# Patient Record
Sex: FEMALE | Race: WHITE | NOT HISPANIC OR LATINO | URBAN - METROPOLITAN AREA
[De-identification: names, ages, dates, MRNs, and addresses within clinical notes are randomized per-mention and may not be internally consistent; named-entity substitution may affect disease eponyms.]

---
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
## 2022-02-12 VITALS
OXYGEN SATURATION: 99 % | SYSTOLIC BLOOD PRESSURE: 130 MMHG | WEIGHT: 132.1 LBS | TEMPERATURE: 98 F | HEART RATE: 82 BPM | HEIGHT: 66 IN | BODY MASS INDEX: 21.23 KG/M2 | DIASTOLIC BLOOD PRESSURE: 72 MMHG

## 2022-02-12 VITALS
BODY MASS INDEX: 21.07 KG/M2 | HEIGHT: 64 IN | SYSTOLIC BLOOD PRESSURE: 110 MMHG | WEIGHT: 123.4 LBS | DIASTOLIC BLOOD PRESSURE: 65 MMHG

## 2022-02-16 NOTE — LETTER
November 11, 2020    GABRIELA STOVALL  [address], [place] [id number]    Dear GABRIELA,    Thank you for selecting our practice as your care provider. Below you will find the results and recent diagnostic testings outcomes we have reviewed.      COVID 19 Negative       Result Name Current Result Reference Range   Coronavirus SARS-CoV-2 (COVID-19)  Not Detected 11/5/2020 Not Detected -        Please contact my practice at the number listed above if you have any questions or concerns.     Sincerely,    Naomie Arreola MA

## 2022-02-16 NOTE — NURSING NOTE
Comprehensive Intake Entered On:  11/4/2020 1:17 PM CST    Performed On:  11/4/2020 1:15 PM CST by Shiloh Riggs CMA               Summary   Chief Complaint :   c/o sore throat, headache, ear pain, runny nose. Sx present x2 days. Verbal consent given for video visit.      Menstrual Status :   Menarcheal   Oneil Shiloh HANKINS - 11/4/2020 1:15 PM CST   Consents   Consent for Immunization Exchange :   Consent Granted   Consent for Immunizations to Providers :   Consent Granted   PastorMarianne Shiloh HANKINS - 11/4/2020 1:15 PM CST   Meds / Allergies   (As Of: 11/4/2020 1:17:37 PM CST)   Allergies (Active)   amoxicillin  Estimated Onset Date:   Unspecified ; Reactions:   Rash (bumps) ; Created By:   Viji Castillo; Reaction Status:   Active ; Category:   Drug ; Substance:   amoxicillin ; Type:   Allergy ; Updated By:   Viji Castillo; Reviewed Date:   9/1/2016 5:47 AM CDT      Singulair  Estimated Onset Date:   Unspecified ; Reactions:   Mood ; Created By:   Viji Castillo; Reaction Status:   Active ; Substance:   Singulair ; Updated By:   Viji Castillo; Reviewed Date:   9/1/2016 5:47 AM CDT        Medication List   (As Of: 11/4/2020 1:17:37 PM CST)   Prescription/Discharge Order    albuterol  :   albuterol ; Status:   Prescribed ; Ordered As Mnemonic:   albuterol CFC free 90 mcg/inh inhalation aerosol ; Simple Display Line:   2 puff(s), INH, QID, PRN: for wheezing, 17 g, 0 Refill(s) ; Ordering Provider:   Sariah Travis; Catalog Code:   albuterol ; Order Dt/Tm:   8/31/2016 1:35:08 PM CDT            ID Risk Screen   Recent Travel History :   No recent travel   Family Member Travel History :   No recent travel   Other Exposure to Infectious Disease :   Unknown   Shiloh Riggs CMA - 11/4/2020 1:15 PM CST

## 2022-02-16 NOTE — TELEPHONE ENCOUNTER
---------------------  From: Hector HANKINS Elisabeth   Sent: 11/4/2020 2:16:53 PM CST  Subject: [facility] Testing     Pt here for covid testing at [facility] today per Dr. Robles. 02 Sat =97%. Specimen sent to [facility] lab. Faxed forms to [facility].

## 2022-02-16 NOTE — TELEPHONE ENCOUNTER
---------------------  From: Mireya Nix   Sent: 11/8/2020 11:11:03 AM CST  Subject: Result: COVID-19     Left message for patient at 11:10 regarding COVID-19 test.  Result is NEGATIVE.  Patient to call with questions or concerns.

## 2022-02-16 NOTE — NURSING NOTE
Comprehensive Intake Entered On:  7/19/2021 2:01 PM CDT    Performed On:  7/19/2021 1:52 PM CDT by Akua Steward LPN               Summary   Chief Complaint :   needs a px for [facility]   Menstrual Status :   Menarcheal   Weight Measured :   132.1 lb(Converted to: 132 lb 2 oz, 59.920 kg)    Height Measured :   66.25 in(Converted to: 5 ft 6 in, 168.27 cm)    Body Mass Index :   21.16 kg/m2   Body Surface Area :   1.67 m2   Systolic Blood Pressure :   130 mmHg   Diastolic Blood Pressure :   72 mmHg   Mean Arterial Pressure :   91 mmHg   Peripheral Pulse Rate :   82 bpm   BP Site :   Right arm   BP Method :   Manual   Temperature Tympanic :   98.0 DegF(Converted to: 36.7 DegC)    Oxygen Saturation :   99 %   Akua Steward LPN - 7/19/2021 1:52 PM CDT   Health Status   Allergies Verified? :   Yes   Medication History Verified? :   Yes   Medical History Verified? :   No   Pre-Visit Planning Status :   Completed   Tobacco Use? :   Never smoker   Akua Steward LPN - 7/19/2021 1:52 PM CDT   Meds / Allergies   (As Of: 7/19/2021 2:01:23 PM CDT)   Allergies (Active)   amoxicillin  Estimated Onset Date:   Unspecified ; Reactions:   Rash (bumps) ; Created By:   Viji Castillo; Reaction Status:   Active ; Category:   Drug ; Substance:   amoxicillin ; Type:   Allergy ; Updated By:   Viji Castillo; Reviewed Date:   11/4/2020 1:19 PM CST      Singulair  Estimated Onset Date:   Unspecified ; Reactions:   Mood ; Created By:   Viji Castillo; Reaction Status:   Active ; Substance:   Singulair ; Updated By:   Viji Castillo; Reviewed Date:   11/4/2020 1:19 PM CST        Medication List   (As Of: 7/19/2021 2:01:23 PM CDT)   Prescription/Discharge Order    albuterol  :   albuterol ; Status:   Prescribed ; Ordered As Mnemonic:   albuterol CFC free 90 mcg/inh inhalation aerosol ; Simple Display Line:   2 puff(s), INH, QID, PRN: for wheezing, 17 g, 0 Refill(s) ; Ordering Provider:   Sariah Travis;  Catalog Code:   albuterol ; Order Dt/Tm:   8/31/2016 1:35:08 PM CDT            Vision Testing POC   Corrective Lenses :   None   Eye, Left Visual Acuity :   20/13   Eye, Right Visual Acuity :   20/15   Eye, Bilateral Visual Acuity :   20/13   Akua Steward LPN - 7/19/2021 3:50 PM CDT   Social History   Social History   (As Of: 7/19/2021 2:01:23 PM CDT)   Tobacco:        Never (less than 100 in lifetime)   (Last Updated: 7/19/2021 1:54:27 PM CDT by Akua Steward LPN)          Electronic Cigarette/Vaping:        Electronic Cigarette Use: Never.   (Last Updated: 7/19/2021 1:54:30 PM CDT by Akua Steward LPN)

## 2022-02-16 NOTE — TELEPHONE ENCOUNTER
---------------------  From: Elba/Naomie LEONARDO (Phone Messages Pool ([id number]_[facility]))   Sent: 11/11/2020 10:07:03 AM CST  Subject: General Message     Phone Message    PCP: LORENE    Time of Call: 0917    Phone Number: [phone]    Returned call at: 1006    Note: Patient called requesting that her covid results be faxed to  high school.    Called and got fax number [phone]. Faxed at [phone]+ and pt notified.

## 2022-02-16 NOTE — NURSING NOTE
Depression Screening Entered On:  7/19/2021 3:52 PM CDT    Performed On:  7/19/2021 3:51 PM CDT by Akua Steward LPN               Depression Screening   Little Interest - Pleasure in Activities :   Not at all   Feeling Down, Depressed, Hopeless :   Not at all   Initial Depression Screen Score :   0 Score   Poor Appetite or Overeating :   Not at all   Trouble Falling or Staying Asleep :   Several days   Feeling Tired or Little Energy :   Several days   Feeling Bad About Yourself :   Not at all   Trouble Concentrating :   Not at all   Moving or Speaking Slowly :   Not at all   Thoughts Better Off Dead or Hurting Self :   Not at all   Difficulty at Work, Home, Getting Along :   Not difficult at all   Detailed Depression Screen Score :   2    Total Depression Screen Score :   2    Akua Steward LPN - 7/19/2021 3:51 PM CDT

## 2022-02-16 NOTE — PROGRESS NOTES
Patient:   GABRIELA STOVALL            MRN: [id number]            FIN: [id number]               Age:   20 years     Sex:  Female     :  2000   Associated Diagnoses:   Physical exam   Author:   Abraham Robles MD      Visit Information      Date of Service: 2021 01:49 pm  Performing Location: [facility]  Encounter#: [id number]      Primary Care Provider (PCP):  Abraham Robles MD    NPI# [id number]      Referring Provider:  Abraham Robles MD    NPI# [id number]      Chief Complaint   2021 1:52 PM CDT    needs a px for [facility]        History of Present Illness             The patient presents for a general exam.  The patient's general health status is described as good.  The patient's diet is described as balanced.  Exercise: routine.  Associated symptoms consist of none.  Medical encounters: none.  Compliance problems: none.  Complaint: will be going to [employer]  plays violin  enjoys many crafts.  Additional pertinent history: no caffeine use, tobacco use none and no alcohol use.     not sexually active      Review of Systems   Constitutional:  No fever, No chills.    Eye   Ear/Nose/Mouth/Throat:  No nasal congestion, No sore throat.    Respiratory:  No shortness of breath, No cough.    Cardiovascular   Breast   Gastrointestinal:  No nausea, No vomiting, No diarrhea, No constipation.    Genitourinary:  No dysuria.    Gynecologic   Hematology/Lymphatics:  No bruising tendency, No swollen lymph glands.    Endocrine   Immunologic:  No recurrent fevers, No recurrent infections.    Musculoskeletal:  No muscle pain.    Integumentary:  No rash.    Neurologic:  No tingling, No headache.    Psychiatric   All other systems.     Health Status   Allergies:    Allergic Reactions (Selected)  Severity Not Documented  Amoxicillin (Rash (bumps))  Singulair (Mood)   Medications:  (Selected)   Prescriptions  Prescribed  albuterol CFC free 90 mcg/inh inhalation aerosol: 2  puff(s), INH, QID, PRN: for wheezing, # 17 g, 0 Refill(s), Type: Maintenance, Pharmacy: [facility], 2 puff(s) inh qid,PRN:for wheezing,    Medications          *denotes recorded medication          albuterol CFC free 90 mcg/inh inhalation aerosol: 2 puff(s), INH, QID, PRN: for wheezing, 17 g, 0 Refill(s).       Problem list:    All Problems (Selected)  Ureteral Reflux / 593.70 / Confirmed      Histories   Past Medical History:    Resolved  Pyelonephritis: Onset in the month of 9/2006 at 5 years  Resolved.  Comments:  8/26/2010 CDT 11:24 AM CDT - Viji Castillo  Hospitalized.   Family History:    No family history items have been selected or recorded.   Procedure history:    Bilateral ureteral implantation (Viktor) on 6/9/2008 at 7 Years.   Social History:        Electronic Cigarette/Vaping Assessment            Electronic Cigarette Use: Never.      Tobacco Assessment            Never (less than 100 in lifetime)        Physical Examination   Vital Signs   7/19/2021 1:52 PM CDT Temperature Tympanic 98.0 DegF    Peripheral Pulse Rate 82 bpm    Systolic Blood Pressure 130 mmHg    Diastolic Blood Pressure 72 mmHg    Mean Arterial Pressure 91 mmHg    BP Site Right arm    BP Method Manual    Oxygen Saturation 99 %      Measurements from flowsheet : Measurements   7/19/2021 1:52 PM CDT Height Measured - Standard 66.25 in    Height/Length Percentile 0.00    Height/Length Z-score -14.55    Weight Measured - Standard 132.1 lb    Weight Percentile 99.67    Weight Z-score 2.72    BSA 1.67 m2    Body Mass Index 21.16 kg/m2    Body Mass Index Percentile 45.75    BMI Z-score -0.11      General:  Alert and oriented, No acute distress.    Eye:  Pupils are equal, round and reactive to light, Normal conjunctiva.    HENT:  Normocephalic, Oral mucosa is moist.    Neck:  Supple.    Respiratory:  Lungs are clear to auscultation, Respirations are non-labored.    Cardiovascular:  Normal rate, Regular rhythm, No edema.    Gastrointestinal:   Soft, Non-tender, Non-distended.    Musculoskeletal:  Normal strength, Normal gait.    Integumentary:  Warm, No rash.    Psychiatric:  Cooperative, Appropriate mood & affect, Normal judgment.       Impression and Plan   Diagnosis     Physical exam (ISR51-ZG Z00.00).     Plan:  reviewed covid shot  discussed transition to college.

## 2022-02-16 NOTE — PROGRESS NOTES
Patient:   GABRIELA STOVALL            MRN: [id number]            FIN: [id number]               Age:   20 years     Sex:  Female     :  2000   Associated Diagnoses:   Acute URI   Author:   Abraham Robles MD      Visit Information      Date of Service: 2020 12:35 pm  Performing Location: [facility]  Encounter#: [id number]      Primary Care Provider (PCP):  Abraham Robles MD    NPI# [id number]      Referring Provider:  Abraham Robles MD    NPI# [id number]      Chief Complaint   2020 1:15 PM CST    c/o sore throat, headache, ear pain, runny nose. Sx present x2 days. Verbal consent given for video visit.        Subjective   Chief complaint 2020 1:15 PM CST    c/o sore throat, headache, ear pain, runny nose. Sx present x2 days. Verbal consent given for video visit.  .     other family with URI symptoms.   grandfather is sick and now in hospice.  no wheezing, no fever or chills    video call 115 to 125      Health Status   Allergies:    Allergic Reactions (Selected)  Severity Not Documented  Amoxicillin (Rash (bumps))  Singulair (Mood)   Medications:  (Selected)   Prescriptions  Prescribed  albuterol CFC free 90 mcg/inh inhalation aerosol: 2 puff(s), INH, QID, PRN: for wheezing, # 17 g, 0 Refill(s), Type: Maintenance, Pharmacy: [facility], 2 puff(s) inh qid,PRN:for wheezing,    Medications          *denotes recorded medication          albuterol CFC free 90 mcg/inh inhalation aerosol: 2 puff(s), INH, QID, PRN: for wheezing, 17 g, 0 Refill(s).       Problem list:    All Problems (Selected)  Ureteral Reflux / 593.70 / Confirmed      Impression and Plan   Assessment and Plan:          Diagnosis: Acute URI (NSB00-MB J06.9).         Course: uri symptoms  needs covid testing.

## 2022-02-16 NOTE — PROGRESS NOTES
Patient:   GABRIELA STOVALL            MRN: [id number]            FIN: [id number]               Age:   16 years     Sex:  Female     :  2000   Associated Diagnoses:   Well child examination   Author:   Sariah Travis      Visit Information      Date of Service: 2017 03:48 pm  Performing Location: [facility]  Encounter#: [id number]      Primary Care Provider (PCP):  Abraham Robles MD    NPI# [id number]      Chief Complaint   2017 3:54 PM CDT     Patient is here for sports physical / WCC.      Well Child History   Chief complaint reviewed and confirmed with patient.    The patient is a 16-year-old female here today for routine health maintenance visit.  Her menstrual cycle started three years ago.  Her cycles are regular.  She denies dysmenorrhea or menorrhagia.  She is not sexually active.  She does not smoke.  She is a very healthy teenager.  She is not participating in sports this year as she has decided to take ballet instead.  She does have a history of reactive airway disease that she has fairly well-controlled recently.  Patient notes her reactive airway disease flares with stress.  Her mother would like for me to discuss with the patient today tracking her menstrual cycle.     Immunizations are up to date.  HPV is up to date as well.            Review of Systems   Constitutional:  Negative.    Eye:  Negative.    Ear/Nose/Mouth/Throat:  Negative.    Respiratory:  Negative.    Cardiovascular:  Negative.    Gastrointestinal:  Negative.    Genitourinary:  Negative.    Musculoskeletal:  Negative.    Integumentary:  Negative.             Health Status   Allergies:    Allergic Reactions (Selected)  Severity Not Documented  Amoxicillin (Rash (bumps))  Singulair (Mood)   Medications:  (Selected)   Prescriptions  Prescribed  albuterol 90 mcg/inh inhalation aerosol: See Instructions, Instructions: 2 puffs q 4 hours prn cough, # 1 EA, 3 Refill(s), Type: Maintenance, Pharmacy:  [id number]  albuterol CFC free 90 mcg/inh inhalation aerosol: 2 puff(s), INH, QID, PRN: for wheezing, # 17 g, 0 Refill(s), Type: Maintenance, Pharmacy: [facility], 2 puff(s) inh qid,PRN:for wheezing  Documented Medications  Documented  Multiple Vitamins oral tablet, chewable: 1 tab(s), Chewed, Daily, # 90 tab(s), 0 Refill(s), Type: Maintenance   Problem list:    All Problems  Ureteral Reflux / ICD-9-.70 / Confirmed  Resolved: Pyelonephritis      Histories   Past Medical History:    Resolved  Pyelonephritis: Onset in the month of 9/2006 at 5 years  Resolved.  Comments:  8/26/2010 CDT 11:24 AM CDT - Viji Castillo  Hospitalized.   Family History:    No family history items have been selected or recorded.   Procedure history:    Bilateral ureteral implantation (Hoang) on 6/9/2008 at 7 Years.   Social History:             No active social history items have been recorded.      Physical Examination   Vital Signs   8/8/2017 3:54 PM CDT HR Method Electronic    Systolic Blood Pressure 110 mmHg    Diastolic Blood Pressure 65 mmHg    Mean Arterial Pressure 80 mmHg    BP Site Right arm    BP Method Electronic      Measurements from flowsheet : Measurements   8/8/2017 3:54 PM CDT Height Measured - Standard 64 in    Weight Measured - Standard 123.4 lb    BSA 1.59 m2    Body Mass Index 21.18 kg/m2    Body Mass Index Percentile 65.80    Ht/Wt Measurement Refused by Patient? No      Eye:  Pupils are equal, round and reactive to light, Extraocular movements are intact.    HENT:  Tympanic membranes are clear, Oral mucosa is moist, No pharyngeal erythema.    Neck:  No lymphadenopathy, No thyromegaly.    Respiratory:  Lungs clear to auscultation bilaterally.  Equal air entry.  Symmetrical chest expansion.  No wheezing.  .    Cardiovascular:  S1 and S2 with regular rate and rhythm.  No murmurs.  Pulses 2+ in all four extremities.  Brisk capillary refill.  .    Gastrointestinal:  Positive bowel sounds in all four  quadrants.  Abdomen is soft, non-distended, non-tender.  No hepatosplenomegaly.  .    Musculoskeletal:  Normal gait, Full ROM UE and LE. No joing swelling., Spine straight with forward flexion. .    Integumentary:  No rash.    Neurologic:  No focal deficits, Normal deep tendon reflexes.    Psychiatric:  Appropriate mood & affect.       Health Maintenance      Recommendations     Pending (in the next year)        Due            Well Child 2 yrs - 18 yrs due  08/08/17  and every 1  year(s)        Near Due            Body Mass Index Check (Female) near due  08/31/17  and every 1  year(s)     Satisfied (in the past 1 year)        Satisfied            Body Mass Index Check (Female) on  08/31/16.        Review / Management   .      Impression and Plan   Diagnosis     Well child examination (TQL10-VH Z00.129).     Plan:  Anticipatory guidance reviewed:  Open communication with parents, daily breakfast, physical activity, avoidance drugs/alcohol/tobacco, car safety.     1.  Discussed menstrual cycle charting. Written chart given to patient to track.     2.  Continue with good nutrition and regular exercise.    3.  Discussed Pap smear screening and breast exams.  She understands this will start when she is in her 20s.     4.  Return to clinic in one year for routine health maintenance visit or as needed.    Patient Instructions:       Counseled: Patient, Family, Verbalized understanding.